# Patient Record
Sex: MALE | Race: WHITE | Employment: FULL TIME | URBAN - METROPOLITAN AREA
[De-identification: names, ages, dates, MRNs, and addresses within clinical notes are randomized per-mention and may not be internally consistent; named-entity substitution may affect disease eponyms.]

---

## 2017-01-31 ENCOUNTER — OFFICE VISIT (OUTPATIENT)
Dept: ORTHOPEDIC SURGERY | Age: 57
End: 2017-01-31

## 2017-01-31 VITALS
DIASTOLIC BLOOD PRESSURE: 82 MMHG | HEIGHT: 75 IN | HEART RATE: 64 BPM | WEIGHT: 315 LBS | SYSTOLIC BLOOD PRESSURE: 138 MMHG | BODY MASS INDEX: 39.17 KG/M2

## 2017-01-31 DIAGNOSIS — S46.211D RUPTURE OF DISTAL BICEPS TENDON, RIGHT, SUBSEQUENT ENCOUNTER: ICD-10-CM

## 2017-01-31 DIAGNOSIS — M25.521 RIGHT ELBOW PAIN: Primary | ICD-10-CM

## 2017-01-31 PROCEDURE — 99213 OFFICE O/P EST LOW 20 MIN: CPT | Performed by: ORTHOPAEDIC SURGERY

## 2017-01-31 PROCEDURE — 73080 X-RAY EXAM OF ELBOW: CPT | Performed by: ORTHOPAEDIC SURGERY

## 2017-05-01 ENCOUNTER — OFFICE VISIT (OUTPATIENT)
Dept: ORTHOPEDIC SURGERY | Age: 57
End: 2017-05-01

## 2017-05-01 VITALS — HEIGHT: 75 IN | BODY MASS INDEX: 39.17 KG/M2 | WEIGHT: 315 LBS

## 2017-05-01 DIAGNOSIS — S46.211D RUPTURE OF DISTAL BICEPS TENDON, RIGHT, SUBSEQUENT ENCOUNTER: Primary | ICD-10-CM

## 2017-05-01 PROCEDURE — 99212 OFFICE O/P EST SF 10 MIN: CPT | Performed by: ORTHOPAEDIC SURGERY

## 2017-05-01 RX ORDER — CETIRIZINE HYDROCHLORIDE 10 MG/1
10 TABLET ORAL DAILY
COMMUNITY

## 2018-12-05 ENCOUNTER — HOSPITAL ENCOUNTER (OUTPATIENT)
Dept: NEUROLOGY | Age: 58
Discharge: HOME | End: 2018-12-05
Payer: COMMERCIAL

## 2018-12-05 PROCEDURE — 95909 NRV CNDJ TST 5-6 STUDIES: CPT

## 2018-12-05 PROCEDURE — 95886 MUSC TEST DONE W/N TEST COMP: CPT

## 2018-12-05 NOTE — PROCEDURES
(ms) Norm Onset (ms) O-P Amp (mV) Norm O-P Amp Site1 Site2 Delta-0 (ms) Dist (cm) Jeyson (m/s) Norm Jeyson (m/s)   Left Peroneal Motor (Ext Dig Brev)   Ankle    7.6 <5.5 1.1 >2.5 B Fib Ankle 6.2 0.0  >40   B Fib    13.8  0.0          Right Peroneal Motor (Ext Dig Brev)   Ankle    4.8 <5.5 2.1 >2.5 B Fib Ankle 8.9 38.0 43 >40   B Fib    13.7  1.7  Poplt B Fib 1.7 7.0 41 >40   Poplt    15.4  1.3          Left Tibial Motor (Abd Patel Brev)   Ankle    5.2 <6.2 2.1 >3.0           Mixed Summary Table     Stim Site NR Peak (ms) Norm Peak (ms) P-T Amp (µV) Norm P-T Amp Site1 Site2 Delta-P (ms) Dist (cm) Jeyson (m/s) Norm Jeyson (m/s)   Right Medial Plantar Mixed (Med Malleolus)   Medial Foot    2.0 <3.7 12.1 >10           EMG     Side Muscle Nerve Root Ins Act Fibs Psw Amp Dur Poly Recrt Int [place] Roots Comment   Right GluteusMed SupGluteal L5-S1 Nml Nml Nml Nml Nml 0 Nml Nml    Right GluteusMax InfGluteal L5-S2 Nml Nml Nml Nml Nml 0 Nml Nml    Right VastusMed Femoral L2-4 Nml Nml Nml Nml Nml 0 Nml Nml    Right AntTibialis Dp Br Fibular L4-5 Nml Nml Nml Nml Nml 0 Nml Nml    Right Peroneus Long Sup Br Fibular L5-S1 Nml Nml Nml Nml Nml 0 Nml Nml    Right Gastroc Tibial S1-2 Nml Nml Nml Nml Nml 0 Nml Nml    Right PostTibialis Tibial L5, S1 Nml Nml Nml Nml Nml 0 Nml Nml    Right ExtHallLong Dp Br Fibular L5, S1 Nml Nml Nml Nml Nml 0 Nml Nml    Right Ext Dig Brev Dp Br Fibular L5, S1 Nml Nml Nml Nml Nml 0 Nml Nml    Right Lumbo Parasp Up Rami L1-2 Nml Nml 1+         Right Lumbo Parasp Mid Rami L3-4 Nml Nml 1+         Right Lumbo Parasp Low Rami L5-S1 Nml Nml Nml         Left GluteusMed SupGluteal L5-S1 Nml Nml Nml Nml Nml 0 Nml Nml    Left GluteusMax InfGluteal L5-S2 Nml Nml Nml Nml Nml 0 Nml Nml    Left VastusMed Femoral L2-4 Nml Nml Nml Nml Nml 0 Nml Nml    Left AntTibialis Dp Br Fibular L4-5 Nml Nml Nml Nml Nml 0 Nml Nml    Left Peroneus Long Sup Br Fibular L5-S1 Nml Nml Nml Nml Nml 0 Nml Nml    Left Gastroc Tibial S1-2 Nml Nml Nml Nml Nml 0 Nml

## 2020-01-06 ENCOUNTER — OFFICE VISIT (OUTPATIENT)
Dept: ORTHOPEDIC SURGERY | Age: 60
End: 2020-01-06
Payer: COMMERCIAL

## 2020-01-06 VITALS — BODY MASS INDEX: 39.17 KG/M2 | HEIGHT: 75 IN | WEIGHT: 315 LBS

## 2020-01-06 PROCEDURE — 20611 DRAIN/INJ JOINT/BURSA W/US: CPT | Performed by: PHYSICIAN ASSISTANT

## 2020-01-06 PROCEDURE — 99214 OFFICE O/P EST MOD 30 MIN: CPT | Performed by: PHYSICIAN ASSISTANT

## 2020-01-06 RX ORDER — ATORVASTATIN CALCIUM 20 MG/1
TABLET, FILM COATED ORAL
COMMUNITY
Start: 2019-12-30

## 2020-01-06 RX ORDER — METHYLPREDNISOLONE ACETATE 40 MG/ML
80 INJECTION, SUSPENSION INTRA-ARTICULAR; INTRALESIONAL; INTRAMUSCULAR; SOFT TISSUE ONCE
Status: COMPLETED | OUTPATIENT
Start: 2020-01-06 | End: 2020-01-06

## 2020-01-06 RX ADMIN — METHYLPREDNISOLONE ACETATE 80 MG: 40 INJECTION, SUSPENSION INTRA-ARTICULAR; INTRALESIONAL; INTRAMUSCULAR; SOFT TISSUE at 16:38

## 2020-01-06 NOTE — PROGRESS NOTES
by mouth daily, Disp: , Rfl:     naproxen (NAPROSYN) 500 MG tablet, Take 1 tablet by mouth 2 times daily, Disp: 30 tablet, Rfl: 0    methocarbamol (ROBAXIN) 500 MG tablet, Take 1 tablet by mouth 3 times daily, Disp: 15 tablet, Rfl: 0    ibuprofen (ADVIL;MOTRIN) 600 MG tablet, Take 1 tablet by mouth every 6 hours as needed for Pain, Disp: 40 tablet, Rfl: 0    doxazosin (CARDURA) 4 MG tablet, , Disp: , Rfl:     metoprolol (TOPROL-XL) 50 MG XL tablet, daily , Disp: , Rfl:     aspirin 81 MG tablet, Take 81 mg by mouth daily, Disp: , Rfl:     triamterene-hydrochlorothiazide (MAXZIDE-25) 37.5-25 MG per tablet, Take 1 tablet by mouth daily. , Disp: , Rfl:   Allergies:  Ace inhibitors and Losartan  Social History:    reports that he quit smoking about 9 years ago. His smoking use included cigarettes. He has a 30.00 pack-year smoking history. He has never used smokeless tobacco. He reports current alcohol use of about 1.0 standard drinks of alcohol per week. He reports that he does not use drugs. Family History:   No family history on file. REVIEW OF SYSTEMS:   For new problems, a full review of systems will be found scanned in the patient's chart. CONSTITUTIONAL: Denies unexplained weight loss, fevers, chills   NEUROLOGICAL: Denies unsteady gait or progressive weakness  SKIN: Denies skin changes, delayed healing, rash, itching       PHYSICAL EXAM:    Vitals: Height 6' 3.2\" (1.91 m), weight (!) 324 lb 1.2 oz (147 kg). GENERAL EXAM:  · General Apparence: Patient is adequately groomed with no evidence of malnutrition. · Orientation: The patient is oriented to time, place and person. · Mood & Affect:The patient's mood and affect are appropriate       Left knee PHYSICAL EXAMINATION:  · Inspection: No visible deformity. No significant edema, erythema or ecchymosis.     · Palpation: Tenderness to palpation on the medial aspect of the left knee    · Range of Motion: Range of motion is not significantly limited    · Strength: No gross strength deficits are noted    · Special Tests:.  Negative Homans testing. Negative Hali's testing. · Skin:  There are no rashes, ulcerations or lesions. · There are no dysvascular changes     Gait & station: Antalgic      Additional Examinations:        Right Lower Extremity: Examination of the right lower extremity does not show any tenderness, deformity or injury. Range of motion is unremarkable. There is no gross instability. There are no rashes, ulcerations or lesions. Strength and tone are normal.      Diagnostic Testing: The following x rays were read and interpreted by myself      1.  3 x-rays of the left knee were taken today and reveal moderate tricompartmental osteoarthritis with osteophyte formation    Orders     Orders Placed This Encounter   Procedures    XR KNEE LEFT (3 VIEWS)     Standing Status:   Future     Number of Occurrences:   1     Standing Expiration Date:   2/6/2020    US ARTHR/ASP/INJ MAJOR JNT/BURSA LEFT     Order Specific Question:   Reason for exam:     Answer:   pain    OSR PT [facility] Physical Therapy     Referral Priority:   Routine     Referral Type:   Eval and Treat     Referral Reason:   Specialty Services Required     Requested Specialty:   Physical Therapy     Number of Visits Requested:   1    EUFLEXXA INJ PER DOSE     LT     Standing Status:   Future     Standing Expiration Date:   1/6/2021         Assessment / Treatment Plan:     1. Left knee osteoarthritis    I discussed with the patient the nature of osteoarthritis of the knee. We talked about treatment of arthritis and the various options that are involved with this. The patient understands that the treatments can vary from essentially doing nothing to a total joint replacement arthroplasty for arthritis. I then went on to describe the utilization of glucosamine and chondroitin sulfate as a joint nutrition product.  We talked about the fact that this is solution. A sterile 22-gauge needle was inserted into the knee and the mixture of 4 mL of 2% Carbocaine, 4 mL of 0.25% Marcaine, and 80 mg of Depo-Medrol was injected under sterile technique. The needle was withdrawn and the puncture site sealed with a Band-Aid. Technique: Under sterile conditions a [employer] ultrasound unit with a variable frequency (6.0-15.0 MHz) linear transducer was used to localize the placement of a 22-gauge needle into the left knee joint. Findings: Successful needle placement for knee injection. Final images were taken and saved for permanent record. The patient tolerated the injection well. The patient was instructed to call the office immediately if there is any pain, redness, warmth, fever, or chills. I spent 25 minutes face-to-face with the patient and greater than 50% of that time was spent counseling/coordinating care for the above-stated diagnosis       Jelani Aquino, [facility]    This dictation was performed with a verbal recognition program (DRAGON) and it was checked for errors. It is possible that there are still dictated errors within this office note. If so, please bring any errors to my attention for an addendum. All efforts were made to ensure that this office note is accurate.

## 2020-01-07 ENCOUNTER — TELEPHONE (OUTPATIENT)
Dept: ORTHOPEDIC SURGERY | Age: 60
End: 2020-01-07

## 2020-01-07 NOTE — TELEPHONE ENCOUNTER
01/07/2020 EUFLEXXA  (SERIES OF 3)  LEFT KNEE. DENIED  NO MEDICAL COVERAGE - CASH BASED PROGRAM. PER LEADERSHIP.  NOT A COVERED BENEFIT FOR THIS SELF FUNDED [facility] [place] PLAN, PER NOTES FOR THIS GROUP. AP    1/08/2020 PER FAX FROM  DEPARTMENT FOR PHARMACY BENEFITS. PLAN EXCLUSION. NOT MEDICALLY NECESSARY. NOT COVERED. REF:  [id number].  AP

## 2020-01-07 NOTE — TELEPHONE ENCOUNTER
CALLED LVM FOR PT TODAY STATING EUFLEXXA INJ LEFT KNEE IS DENIED THROUGH INSURANCE. TOLD HIM IF HE WOULD LIKE TO BE SELF PAY-JOSEFINA ($129)X 3 WEEKS TO CALL ME BACK & LET ME KNOW.  Via Dylon Taylor

## 2020-01-08 ENCOUNTER — HOSPITAL ENCOUNTER (OUTPATIENT)
Dept: PHYSICAL THERAPY | Age: 60
Setting detail: THERAPIES SERIES
Discharge: HOME | End: 2020-01-08
Payer: COMMERCIAL

## 2020-01-08 NOTE — FLOWSHEET NOTE
[facility] [facility], [address], [address] [place] [address]        Physical Therapy  Cancellation/No-show Note  Patient Name:  Padmini Eaton  :  1960   Date:  2020  Cancelled visits to date: 1  No-shows to date: 0    For today's appointment patient:  ?  Cancelled  x  Rescheduled appointment  ? No-show     Reason given by patient:  ?  Patient ill  ? Conflicting appointment  ? No transportation    x  Conflict with work  ? No reason given  ?   Other:     Comments:      Electronically signed by:  Dianna Green, [facility] [facility], DPT [id number]

## 2020-01-14 ENCOUNTER — HOSPITAL ENCOUNTER (OUTPATIENT)
Dept: PHYSICAL THERAPY | Age: 60
Setting detail: THERAPIES SERIES
Discharge: HOME | End: 2020-01-14
Payer: COMMERCIAL

## 2020-03-23 ENCOUNTER — OFFICE VISIT (OUTPATIENT)
Dept: ORTHOPEDIC SURGERY | Age: 60
End: 2020-03-23
Payer: COMMERCIAL

## 2020-03-23 VITALS — BODY MASS INDEX: 39.17 KG/M2 | WEIGHT: 315 LBS | HEIGHT: 75 IN

## 2020-03-23 PROCEDURE — 20611 DRAIN/INJ JOINT/BURSA W/US: CPT | Performed by: PHYSICIAN ASSISTANT

## 2020-03-23 RX ORDER — METHYLPREDNISOLONE ACETATE 40 MG/ML
80 INJECTION, SUSPENSION INTRA-ARTICULAR; INTRALESIONAL; INTRAMUSCULAR; SOFT TISSUE ONCE
Status: COMPLETED | OUTPATIENT
Start: 2020-03-23 | End: 2020-03-23

## 2020-03-23 RX ORDER — BUPIVACAINE HYDROCHLORIDE 2.5 MG/ML
30 INJECTION, SOLUTION INFILTRATION; PERINEURAL ONCE
Status: COMPLETED | OUTPATIENT
Start: 2020-03-23 | End: 2020-03-23

## 2020-03-23 RX ORDER — LIDOCAINE HYDROCHLORIDE 10 MG/ML
20 INJECTION, SOLUTION INFILTRATION; PERINEURAL ONCE
Status: COMPLETED | OUTPATIENT
Start: 2020-03-23 | End: 2020-03-23

## 2020-03-23 RX ADMIN — LIDOCAINE HYDROCHLORIDE 20 ML: 10 INJECTION, SOLUTION INFILTRATION; PERINEURAL at 14:42

## 2020-03-23 RX ADMIN — BUPIVACAINE HYDROCHLORIDE 75 MG: 2.5 INJECTION, SOLUTION INFILTRATION; PERINEURAL at 14:41

## 2020-03-23 RX ADMIN — METHYLPREDNISOLONE ACETATE 80 MG: 40 INJECTION, SUSPENSION INTRA-ARTICULAR; INTRALESIONAL; INTRAMUSCULAR; SOFT TISSUE at 14:41

## 2020-08-17 ENCOUNTER — NURSE ONLY (OUTPATIENT)
Dept: ORTHOPEDIC SURGERY | Age: 60
End: 2020-08-17
Payer: COMMERCIAL

## 2020-08-17 VITALS — BODY MASS INDEX: 39.17 KG/M2 | WEIGHT: 315 LBS | HEIGHT: 75 IN

## 2020-08-17 PROCEDURE — 20611 DRAIN/INJ JOINT/BURSA W/US: CPT | Performed by: PHYSICIAN ASSISTANT

## 2020-08-17 RX ORDER — LIDOCAINE HYDROCHLORIDE 10 MG/ML
20 INJECTION, SOLUTION INFILTRATION; PERINEURAL ONCE
Status: SHIPPED | OUTPATIENT
Start: 2020-08-17

## 2020-08-17 RX ORDER — BUPIVACAINE HYDROCHLORIDE 2.5 MG/ML
30 INJECTION, SOLUTION INFILTRATION; PERINEURAL ONCE
Status: SHIPPED | OUTPATIENT
Start: 2020-08-17

## 2020-08-17 RX ORDER — METHYLPREDNISOLONE ACETATE 40 MG/ML
80 INJECTION, SUSPENSION INTRA-ARTICULAR; INTRALESIONAL; INTRAMUSCULAR; SOFT TISSUE ONCE
Status: SHIPPED | OUTPATIENT
Start: 2020-08-17

## 2020-08-17 NOTE — PROGRESS NOTES
Chief Complaint   Patient presents with    Injections     #1 SUPARTZ LEFT KNEE-SELF PAY     Dx: Osteoarthritis left knee    The patient is symptomatic from osteoarthritis of the left knee joint with documented radiological signs of arthritis. The patient has also failed 3 months of conservative treatment including home exercise, education, Tylenol and/or NSAIDs use. The patient was offered a Visco supplementation today. Risks, benefits, and alternatives to the injections were discussed in detail with the patient. The risks discussed included but are not limited to infection, skin reactions, hot swollen joints, and anaphylaxis. The patient gave verbal informed consent for the injection. The patient's skin was prepped with  3 sterile gauze  pads soaked with alcohol solution and the knee joint was injected with 2cc Supartz intra-articularly under sterile conditions. Technique: Under sterile conditions a [employer] ultrasound unit with a variable frequency (6.0-15.0 MHz) linear transducer was used to localize the placement of a 22-gauge needle into the left knee joint. Findings: Successful needle placement for intra-articular Visco supplementation injection. Final images were taken and saved for permanent record. The patient tolerated the injection reasonably well. The patient was given instructions to ice the kne and avoid strenuous activities for 24-48 hours. The patient was instructed to call the office immediately if there is increased pain, redness, warmth, fever, or chills. We will see the patient back in one week for their second injection. Ultrasound guidance was used for this injection but images were unable to be saved secondary to computer malfunction. Patient is self-pay for the medication. Annabella [facility]cal[facility]    This dictation was performed with a verbal recognition program [facility]) and it was checked for errors.  It is possible that there are still dictated errors within this office note. If so, please bring any errors to my attention for an addendum. All efforts were made to ensure that this office note is accurate.

## 2020-08-24 ENCOUNTER — NURSE ONLY (OUTPATIENT)
Dept: ORTHOPEDIC SURGERY | Age: 60
End: 2020-08-24

## 2020-08-31 ENCOUNTER — NURSE ONLY (OUTPATIENT)
Dept: ORTHOPEDIC SURGERY | Age: 60
End: 2020-08-31

## 2020-08-31 NOTE — PROGRESS NOTES
Chief Complaint   Patient presents with    Knee Pain     #3 SUPARTZ LT KNEE-SELF PAY     Dx: Osteoarthritis left knee      The patient returns today for their third and final left  knee Visco supplementation injection. The risks, benefits, and complications of the injections were again discussed in detail with the patient. The risks discussed include but are not limited to infection, skin reactions, hot swollen joints, and anaphylaxis. The patient gave verbal informed consent for the injection. The patient's skin was prepped with 3 sterile gauze pads soaked with alcohol solution and the knee joint was injected with 2cc Supartz intra-articularly under sterile conditions. Technique: Under sterile conditions a [employer] ultrasound unit with a variable frequency (6.0-15.0 MHz) linear transducer was used to localize the placement of a 22-gauge needle into the left knee joint. Findings: Successful needle placement for intra-articular Visco supplementation injection. Final images were taken and saved for permanent record. The patient tolerated the injection reasonably well. The patient was given instructions to ice of the knee and avoid strenuous activity for 24-48 hours. The patient was instructed to call the office immediately if there is increased pain, redness, warmth, fever, or chills. We will see the patient back on an as-needed basis  from this point. Giuseppe Parmar, [facility]    This dictation was performed with a verbal recognition program [facility]) and it was checked for errors. It is possible that there are still dictated errors within this office note. If so, please bring any errors to my attention for an addendum. All efforts were made to ensure that this office note is accurate.

## 2022-01-07 ENCOUNTER — HOSPITAL ENCOUNTER (EMERGENCY)
Age: 62
Discharge: HOME | End: 2022-01-07
Attending: EMERGENCY MEDICINE
Payer: COMMERCIAL

## 2022-01-07 VITALS
BODY MASS INDEX: 35.93 KG/M2 | WEIGHT: 289 LBS | OXYGEN SATURATION: 97 % | SYSTOLIC BLOOD PRESSURE: 150 MMHG | HEIGHT: 75 IN | HEART RATE: 70 BPM | DIASTOLIC BLOOD PRESSURE: 80 MMHG | RESPIRATION RATE: 18 BRPM | TEMPERATURE: 98.3 F

## 2022-01-07 DIAGNOSIS — S60.410A ABRASION OF RIGHT INDEX FINGER, INITIAL ENCOUNTER: ICD-10-CM

## 2022-01-07 DIAGNOSIS — S61.211A LACERATION OF LEFT INDEX FINGER WITHOUT FOREIGN BODY WITHOUT DAMAGE TO NAIL, INITIAL ENCOUNTER: Primary | ICD-10-CM

## 2022-01-07 DIAGNOSIS — Z92.29 UP TO DATE WITH DIPHTHERIA-TETANUS VACCINATION: ICD-10-CM

## 2022-01-07 PROCEDURE — 12001 RPR S/N/AX/GEN/TRNK 2.5CM/<: CPT

## 2022-01-07 PROCEDURE — 99284 EMERGENCY DEPT VISIT MOD MDM: CPT

## 2022-01-07 PROCEDURE — 6370000000 HC RX 637 (ALT 250 FOR IP): Performed by: EMERGENCY MEDICINE

## 2022-01-07 RX ADMIN — Medication: at 20:21

## 2022-01-08 NOTE — ED PROVIDER NOTES
CHIEF COMPLAINT  Laceration      HISTORY OF PRESENT ILLNESS  Kusum Cavanaugh is a 65 y.o. male presents to the ED with Index finger lacerations, states he was hunting, and the bow strings snapped on his fingers,. Bleeding controlled, occurred this afternoon, unsure last tetanus booster. No history of diabetes or poor wound healing, no history of bleeding disorders or blood thinners, he is on blood pressure medication, no meds PTA, no other complaints, modifying factors or associated symptoms. I have reviewed the following from the nursing documentation. Past Medical History:   Diagnosis Date    Hyperlipidemia     Hypertension      Past Surgical History:   Procedure Laterality Date    BICEPS TENDON REPAIR Right     KNEE ARTHROSCOPY Left 18 Mar 2016    TONSILLECTOMY  childhood    complication- bleeding postop     History reviewed. No pertinent family history. Social History     Socioeconomic History    Marital status:      Spouse name: Not on file    Number of children: Not on file    Years of education: Not on file    Highest education level: Not on file   Occupational History    Not on file   Tobacco Use    Smoking status: Former Smoker     Packs/day: 1.50     Years: 20.00     Pack years: 30.00     Types: Cigarettes     Quit date: 3/14/2010     Years since quittin.8    Smokeless tobacco: Never Used    Tobacco comment: on/off 20 years    Vaping Use    Vaping Use: Never used   Substance and Sexual Activity    Alcohol use:  Yes     Alcohol/week: 12.0 standard drinks     Types: 12 Cans of beer per week     Comment: weekends    Drug use: No    Sexual activity: Yes     Partners: Female   Other Topics Concern    Not on file   Social History Narrative    Not on file     Social Determinants of Health     Financial Resource Strain:     Difficulty of Paying Living Expenses: Not on file   Food Insecurity:     Worried About Running Out of Food in the Last Year: Not on file    Angeline alberto Food in the Last Year: Not on file   Transportation Needs:     Lack of Transportation (Medical): Not on file    Lack of Transportation (Non-Medical):  Not on file   Physical Activity:     Days of Exercise per Week: Not on file    Minutes of Exercise per Session: Not on file   Stress:     Feeling of Stress : Not on file   Social Connections:     Frequency of Communication with Friends and Family: Not on file    Frequency of Social Gatherings with Friends and Family: Not on file    Attends Restoration Services: Not on file    Active Member of [address] [place] [address] [email or web address] or Organizations: Not on file    Attends Club or Organization Meetings: Not on file    Marital Status: Not on file   Intimate Partner Violence:     Fear of Current or Ex-Partner: Not on file    Emotionally Abused: Not on file    Physically Abused: Not on file    Sexually Abused: Not on file   Housing Stability:     Unable to Pay for Housing in the Last Year: Not on file    Number of Places Lived in the Last Year: Not on file    Unstable Housing in the Last Year: Not on file     Current Facility-Administered Medications   Medication Dose Route Frequency Provider Last Rate Last Admin    methylPREDNISolone acetate (DEPO-MEDROL) injection 80 mg  80 mg Intra-artICUlar Once LORI Knapp        bupivacaine (MARCAINE) 0.25 % injection 75 mg  30 mL Intra-artICUlar Once LORI Drew        lidocaine 1 % injection 20 mL  20 mL Intra-artICUlar Once [place], [place]         Current Outpatient Medications   Medication Sig Dispense Refill    atorvastatin (LIPITOR) 20 MG tablet       Fluticasone Propionate (FLONASE ALLERGY RELIEF NA) by Nasal route      cetirizine (ZYRTEC ALLERGY) 10 MG tablet Take 10 mg by mouth daily      methocarbamol (ROBAXIN) 500 MG tablet Take 1 tablet by mouth 3 times daily 15 tablet 0    doxazosin (CARDURA) 4 MG tablet       metoprolol (TOPROL-XL) 50 MG XL tablet daily       triamterene-hydrochlorothiazide (MAXZIDE-25) 37.5-25 MG per tablet Take 1 tablet by mouth daily. Allergies   Allergen Reactions    Ace Inhibitors Swelling    Losartan Swelling       REVIEW OF SYSTEMS  10 systems reviewed, pertinent positives per HPI otherwise noted to be negative. PHYSICAL EXAM  BP (!) 157/86   Pulse 70   Temp 98.2 °F (36.8 °C) (Oral)   Resp 16   Ht 6' 3\" (1.905 m)   Wt 289 lb (131.1 kg)   SpO2 97%   BMI 36.12 kg/m²   GENERAL APPEARANCE: Awake and alert. Cooperative. No acute distress  HEAD: Normocephalic. Atraumatic. EYES: PERRL. EOM's grossly intact. ENT: Mucous membranes are moist.  Airway patent, no stridor  NECK: Supple. No rigidity  HEART: RRR. No murmurs  LUNGS: Respirations nonlabored. Lungs are clear to auscultation bilaterally. EXTREMITIES: No peripheral edema. Moves all extremities equally. All extremities neurovascularly intact. See photos below of bilateral index fingers, dorsum of right index finger with abrasion/contused tissue over the dorsal DIP joint, not through subcutaneous tissue, but epidermal layer disrupted, arthritic appearing hands, left index finger dorsum with abrasion and central laceration over the dorsal middle phalanx, through subcutaneous tissue, no visible tendon or bone, normal hand tendon exam, distal sensation intact in all digits, less than 2-second cap refill in all digits, bleeding controlled, 2+ radial pulse  SKIN: Warm and dry. No acute rashes. NEUROLOGICAL: Alert and oriented. No gross facial drooping. Normal speech, steady gait  PSYCHIATRIC: Normal mood and affect. L index finger:    R index finger:          PROCEDURES  Patient Name: Josseline Barrios   Medical Record Number: [id number]    Room/Bed: 02/02  Laceration Repair Procedure Note  Indication: Laceration, L index finger    Procedure: The patient was placed in the appropriate position and anesthesia around the laceration was obtained by infiltration using LET gel.  The area was then cleansed with betadine and draped in a sterile fashion, irrigated with normal saline and explored with no foreign bodies discovered and no tendon injury noted. The laceration was closed with 4-0 Prolene using interrupted sutures. There were no additional lacerations requiring repair. The wound area was then dressed with bacitracin, a bandage and tape. Total repaired wound length: 2 cm. Other Items: Suture count: 3    The patient tolerated the procedure well. Complications: None, EBL less than 2cc                ED COURSE/MDM  Patient seen and evaluated. Old records reviewed. 80-year-old male index finger injury caused by hunting bow, primarily abrasion, superficial laceration, but the left index finger did have a laceration through subcutaneous tissue, gaping w/ ROM, no tendon involvement, and full range of motion noted, patient did not believe there was any fracture/foreign bodies so he declined x-ray today, repaired left index finger with 3 sutures, he preferred avoidance of needles/digital block, and had adequate pain relief for suturing with LET gel, though I did offer further anesthetic, explained he was will need removed in about a week, told to watch for signs of infection, bleeding controlled, encourage primary care follow-up for removal, or come back here, and follow-up for his blood pressure which was elevated, used thin layer of antibiotic ointment, given some for home, and given dressings instructed on dressing changes, strict return precautions given, all questions answered, will return if any worsening symptoms or new concerns, see AVS for further discharge information, patient verbalized understanding of plan, felt comfortable going home.       Orders Placed This Encounter   Procedures    Wound care     Orders Placed This Encounter   Medications    L-E gel     ED Course as of 01/07/22 2113   [facility] Jan 07, 2022 1928 He declined need for x-rays, did not feel like anything was broken, and has full range of motion. [SY]   1928 Up-to-date on tetanus as of 6/4/2021 [SY]   2110 3 sutures placed in the left index finger [SY]      ED Course User Index  [SY] Ad Sutton DO       CLINICAL IMPRESSION  1. Laceration of left index finger without foreign body without damage to nail, initial encounter    2. Abrasion of right index finger, initial encounter    3. Up to date with diphtheria-tetanus vaccination        Blood pressure (!) 157/86, pulse 70, temperature 98.2 °F (36.8 °C), temperature source Oral, resp. rate 16, height 6' 3\" (1.905 m), weight 289 lb (131.1 kg), SpO2 97 %. DISPOSITION  Meryle Mark was discharged to home in stable condition.                    Ad Sutton DO  01/12/22 [id number]